# Patient Record
Sex: FEMALE | Race: BLACK OR AFRICAN AMERICAN | NOT HISPANIC OR LATINO | Employment: OTHER | URBAN - METROPOLITAN AREA
[De-identification: names, ages, dates, MRNs, and addresses within clinical notes are randomized per-mention and may not be internally consistent; named-entity substitution may affect disease eponyms.]

---

## 2017-11-29 ENCOUNTER — HISTORICAL (OUTPATIENT)
Dept: RADIOLOGY | Facility: HOSPITAL | Age: 57
End: 2017-11-29

## 2019-03-15 ENCOUNTER — HISTORICAL (OUTPATIENT)
Dept: RADIOLOGY | Facility: HOSPITAL | Age: 59
End: 2019-03-15

## 2019-04-26 LAB
HUMAN PAPILLOMAVIRUS (HPV): NORMAL
PAP RECOMMENDATION EXT: NORMAL
PAP SMEAR: NORMAL

## 2020-06-04 ENCOUNTER — HISTORICAL (OUTPATIENT)
Dept: RADIOLOGY | Facility: HOSPITAL | Age: 60
End: 2020-06-04

## 2020-06-09 ENCOUNTER — HISTORICAL (OUTPATIENT)
Dept: ENDOSCOPY | Facility: HOSPITAL | Age: 60
End: 2020-06-09

## 2020-06-29 ENCOUNTER — HISTORICAL (OUTPATIENT)
Dept: RADIOLOGY | Facility: HOSPITAL | Age: 60
End: 2020-06-29

## 2021-08-02 ENCOUNTER — HISTORICAL (OUTPATIENT)
Dept: RADIOLOGY | Facility: HOSPITAL | Age: 61
End: 2021-08-02

## 2022-04-10 ENCOUNTER — HISTORICAL (OUTPATIENT)
Dept: ADMINISTRATIVE | Facility: HOSPITAL | Age: 62
End: 2022-04-10
Payer: MEDICARE

## 2022-04-28 VITALS
DIASTOLIC BLOOD PRESSURE: 83 MMHG | SYSTOLIC BLOOD PRESSURE: 152 MMHG | OXYGEN SATURATION: 95 % | BODY MASS INDEX: 26.22 KG/M2 | WEIGHT: 148 LBS | HEIGHT: 63 IN

## 2022-05-31 ENCOUNTER — OFFICE VISIT (OUTPATIENT)
Dept: RHEUMATOLOGY | Facility: CLINIC | Age: 62
End: 2022-05-31
Payer: MEDICARE

## 2022-05-31 VITALS
HEART RATE: 60 BPM | WEIGHT: 150.63 LBS | OXYGEN SATURATION: 95 % | DIASTOLIC BLOOD PRESSURE: 77 MMHG | RESPIRATION RATE: 18 BRPM | SYSTOLIC BLOOD PRESSURE: 167 MMHG | HEIGHT: 63 IN | BODY MASS INDEX: 26.69 KG/M2 | TEMPERATURE: 98 F

## 2022-05-31 DIAGNOSIS — D56.1: ICD-10-CM

## 2022-05-31 DIAGNOSIS — E55.9 VITAMIN D DEFICIENCY: ICD-10-CM

## 2022-05-31 DIAGNOSIS — M35.9 UNDIFFERENTIATED CONNECTIVE TISSUE DISEASE: Primary | ICD-10-CM

## 2022-05-31 DIAGNOSIS — E53.8 FOLIC ACID DEFICIENCY: ICD-10-CM

## 2022-05-31 DIAGNOSIS — L65.9 HAIR LOSS: ICD-10-CM

## 2022-05-31 PROCEDURE — 99214 OFFICE O/P EST MOD 30 MIN: CPT | Mod: PBBFAC | Performed by: INTERNAL MEDICINE

## 2022-05-31 PROCEDURE — 99999 PR PBB SHADOW E&M-EST. PATIENT-LVL IV: ICD-10-PCS | Mod: PBBFAC,,, | Performed by: INTERNAL MEDICINE

## 2022-05-31 PROCEDURE — 99213 OFFICE O/P EST LOW 20 MIN: CPT | Mod: S$PBB,,, | Performed by: INTERNAL MEDICINE

## 2022-05-31 PROCEDURE — 99999 PR PBB SHADOW E&M-EST. PATIENT-LVL IV: CPT | Mod: PBBFAC,,, | Performed by: INTERNAL MEDICINE

## 2022-05-31 PROCEDURE — 99213 PR OFFICE/OUTPT VISIT, EST, LEVL III, 20-29 MIN: ICD-10-PCS | Mod: S$PBB,,, | Performed by: INTERNAL MEDICINE

## 2022-05-31 RX ORDER — HYDROXYCHLOROQUINE SULFATE 200 MG/1
200 TABLET, FILM COATED ORAL 2 TIMES DAILY
COMMUNITY
Start: 2022-04-07 | End: 2022-05-31 | Stop reason: SDUPTHER

## 2022-05-31 RX ORDER — CLOPIDOGREL BISULFATE 75 MG/1
75 TABLET ORAL DAILY
COMMUNITY
Start: 2022-05-09

## 2022-05-31 RX ORDER — MULTIVIT-MIN/IRON/FOLIC/HRB186 3.3 MG-25
1 TABLET ORAL DAILY
Qty: 30 TABLET | Refills: 5 | Status: SHIPPED | OUTPATIENT
Start: 2022-05-31 | End: 2023-06-14

## 2022-05-31 RX ORDER — HYDROXYCHLOROQUINE SULFATE 200 MG/1
200 TABLET, FILM COATED ORAL 2 TIMES DAILY
Qty: 60 TABLET | Refills: 5 | Status: SHIPPED | OUTPATIENT
Start: 2022-05-31 | End: 2022-09-12 | Stop reason: SDUPTHER

## 2022-05-31 RX ORDER — FOLIC ACID-PYRIDOXINE-CYANOCOBALAMIN TAB 2.5-25-2 MG 2.5-25-2 MG
1 TAB ORAL EVERY MORNING
Qty: 30 TABLET | Refills: 5 | Status: SHIPPED | OUTPATIENT
Start: 2022-05-31 | End: 2022-09-12 | Stop reason: SDUPTHER

## 2022-05-31 RX ORDER — ASPIRIN 81 MG/1
81 TABLET ORAL DAILY
COMMUNITY

## 2022-05-31 RX ORDER — CALCIUM CITRATE/VITAMIN D3 200MG-6.25
1 TABLET ORAL DAILY
COMMUNITY
Start: 2022-04-26

## 2022-05-31 RX ORDER — LORATADINE 10 MG/1
10 TABLET ORAL DAILY
COMMUNITY
Start: 2022-05-23

## 2022-05-31 RX ORDER — FERROUS SULFATE TAB 325 MG (65 MG ELEMENTAL FE) 325 (65 FE) MG
65 TAB ORAL DAILY
COMMUNITY
Start: 2022-03-15

## 2022-05-31 RX ORDER — PANTOPRAZOLE SODIUM 40 MG/1
40 TABLET, DELAYED RELEASE ORAL 2 TIMES DAILY
COMMUNITY
Start: 2022-03-15 | End: 2022-09-12 | Stop reason: SDUPTHER

## 2022-05-31 RX ORDER — ALBUTEROL SULFATE 90 UG/1
2 AEROSOL, METERED RESPIRATORY (INHALATION) 2 TIMES DAILY PRN
COMMUNITY

## 2022-05-31 RX ORDER — ENALAPRIL MALEATE 5 MG/1
5 TABLET ORAL DAILY
COMMUNITY
Start: 2022-05-23

## 2022-05-31 RX ORDER — FOLIC ACID-PYRIDOXINE-CYANOCOBALAMIN TAB 2.5-25-2 MG 2.5-25-2 MG
1 TAB ORAL EVERY MORNING
COMMUNITY
Start: 2022-05-09 | End: 2022-05-31 | Stop reason: SDUPTHER

## 2022-05-31 RX ORDER — ATORVASTATIN CALCIUM 10 MG/1
10 TABLET, FILM COATED ORAL DAILY
COMMUNITY
End: 2023-06-14

## 2022-05-31 RX ORDER — ATENOLOL 50 MG/1
50 TABLET ORAL DAILY
COMMUNITY
Start: 2022-05-23

## 2022-05-31 NOTE — PROGRESS NOTES
"Subjective:       Patient ID: Robyn Hong is a 61 y.o. female.    Chief Complaint: Follow-up    The patient is complaining of joint pain involving the MCP PIP wrist elbow shoulders hips knees and ankles bilaterally.  The pain is 2/10 in intensity dull in quality and continuous.  That is associated with a morning stiffness lasting for less than 60 minutes.  Is also having difficulty maintaining a good night of sleep.  This has been associated with myalgias.  Muscle aches are 2/10 in intensity dull in quality and continuous.  They are associated with fatigue.  No fever no chills no others.    Follow-up  Pertinent negatives include no abdominal pain, chest pain, chills, congestion, fever, rash, vomiting or weakness.     Review of Systems   Constitutional: Negative for appetite change, chills and fever.   HENT: Negative for congestion, ear pain, mouth sores, nosebleeds and trouble swallowing.    Eyes: Negative for photophobia and discharge.   Respiratory: Negative for chest tightness and shortness of breath.    Cardiovascular: Negative for chest pain.   Gastrointestinal: Negative for abdominal pain and vomiting.   Endocrine: Negative.    Genitourinary: Negative for hematuria.   Musculoskeletal:        As per HPI   Skin: Negative for rash.        Hair loss    Neurological: Negative for weakness.         Objective:   BP (!) 167/77   Pulse 60   Temp 98.2 °F (36.8 °C) (Oral)   Resp 18   Ht 5' 3" (1.6 m)   Wt 68.3 kg (150 lb 9.6 oz)   SpO2 95%   BMI 26.68 kg/m²      Physical Exam   Musculoskeletal:      Right shoulder: Normal.      Left shoulder: Normal.      Right elbow: Normal.      Left elbow: Normal.      Right wrist: Normal.      Left wrist: Normal.      Right hip: Normal.      Left hip: Normal.      Right knee: Normal.      Left knee: Normal.   Skin:   Hair loss        Right Side Rheumatological Exam     Examination finds the shoulder, elbow, wrist, knee, 1st PIP, 1st MCP, 2nd PIP, 2nd MCP, 3rd PIP, 3rd MCP, " 4th PIP, 4th MCP, 5th PIP, 5th MCP, temporomandibular, hip, ankle, 1st MTP, 2nd MTP, 3rd MTP, 4th MTP and 5th MTP normal.    Left Side Rheumatological Exam     Examination finds the shoulder, elbow, wrist, knee, 1st PIP, 1st MCP, 2nd PIP, 2nd MCP, 3rd PIP, 3rd MCP, 4th PIP, 4th MCP, 5th PIP, 5th MCP, temporomandibular, hip, ankle, 1st MTP, 2nd MTP, 3rd MTP, 4th MTP and 5th MTP normal.         Completed Fibromyalgia exam 2/18 tender points.  No data to display     Assessment:       1. Undifferentiated connective tissue disease    2. Homozygous beta thalassemia    3. Vitamin D deficiency    4. Folic acid deficiency    5. Hair loss            Plan:       Problem List Items Addressed This Visit        Immunology/Multi System    Undifferentiated connective tissue disease - Primary    Relevant Medications    FOLBIC 2.5-25-2 mg Tab    hydrOXYchloroQUINE (PLAQUENIL) 200 mg tablet    multivit min-iron-FA-herb 186 (HAIR, SKIN AND NAILS ADVANCED) 3.3 mg iron-25 mcg Tab       Oncology    Homozygous beta thalassemia    Relevant Medications    FOLBIC 2.5-25-2 mg Tab    hydrOXYchloroQUINE (PLAQUENIL) 200 mg tablet    multivit min-iron-FA-herb 186 (HAIR, SKIN AND NAILS ADVANCED) 3.3 mg iron-25 mcg Tab       Endocrine    Vitamin D deficiency    Relevant Medications    FOLBIC 2.5-25-2 mg Tab    hydrOXYchloroQUINE (PLAQUENIL) 200 mg tablet    multivit min-iron-FA-herb 186 (HAIR, SKIN AND NAILS ADVANCED) 3.3 mg iron-25 mcg Tab    Folic acid deficiency    Relevant Medications    FOLBIC 2.5-25-2 mg Tab    hydrOXYchloroQUINE (PLAQUENIL) 200 mg tablet    multivit min-iron-FA-herb 186 (HAIR, SKIN AND NAILS ADVANCED) 3.3 mg iron-25 mcg Tab      Other Visit Diagnoses     Hair loss        Relevant Medications    FOLBIC 2.5-25-2 mg Tab    hydrOXYchloroQUINE (PLAQUENIL) 200 mg tablet    multivit min-iron-FA-herb 186 (HAIR, SKIN AND NAILS ADVANCED) 3.3 mg iron-25 mcg Tab

## 2022-07-25 ENCOUNTER — OFFICE VISIT (OUTPATIENT)
Dept: HEMATOLOGY/ONCOLOGY | Facility: CLINIC | Age: 62
End: 2022-07-25
Payer: MEDICARE

## 2022-07-25 VITALS
RESPIRATION RATE: 18 BRPM | HEIGHT: 63 IN | TEMPERATURE: 99 F | WEIGHT: 152.69 LBS | OXYGEN SATURATION: 98 % | DIASTOLIC BLOOD PRESSURE: 65 MMHG | HEART RATE: 76 BPM | BODY MASS INDEX: 27.05 KG/M2 | SYSTOLIC BLOOD PRESSURE: 126 MMHG

## 2022-07-25 DIAGNOSIS — D56.1 BETA THALASSEMIA: ICD-10-CM

## 2022-07-25 DIAGNOSIS — D72.819 LEUKOPENIA, UNSPECIFIED TYPE: Primary | ICD-10-CM

## 2022-07-25 PROCEDURE — 99214 OFFICE O/P EST MOD 30 MIN: CPT | Mod: ,,, | Performed by: NURSE PRACTITIONER

## 2022-07-25 PROCEDURE — 99214 PR OFFICE/OUTPT VISIT, EST, LEVL IV, 30-39 MIN: ICD-10-PCS | Mod: ,,, | Performed by: NURSE PRACTITIONER

## 2022-07-25 NOTE — PROGRESS NOTES
"[facility]    PATIENT: Robyn Hong  MRN: [id number]  DATE: 7/25/2022    Diagnosis: Anemia and leukocytopenia.     Chief Complaint: Results.     Oncologic History:   Robyn is referred for evaluation of anemia and leukocytopenia. She states that she was anemic about a year ago after she had a bleeding ulcer. She is not aware of any problems with her white blood cell count. She has not had an increase in infections. She denies any symptoms of lupus such as rash or joint pain    Subjective:     Interval History:  07/25/2022:  Ms. Hong presents to clinic today for a 3 month follow up visit for anemia and leukocytopenia.   States has been doing good. No fever or infections. No hospital or ER visits last 3 months.   Good energy level, no SOB.   Taking oral iron, one tablet TID. No nausea or constipation.   Folic acid one tablet daily. Per Dr. Zacarias.   States GERD reported last visit has resolved.     1/26/22: WBC 3.65, ANC 2.5, HGB 10.1, MCV 88, PLT 205KALC 0.8, CMPWNL. No infections. When asked what problems she is having she says "none". Is UTD on MMG; last colonoscopy 2019 or 2020 7/26/21 Returns to go over additional testing. Has beta thalassemia minor with elevated HBG A2 and F. She has not heard anything about the rheumatology referral and I will place again. She went to the ED in early July with elevated CBG and was found to have a UTI. Patient given a hand out regarding thalassemia.     Past Medical History:   Undifferentiated connective tissue disease, Leukopenia  Vitamin D deficiency  Folate deficiency  Beta thalassemia major    Past Surgical History:   Procedure Laterality Date    BREAST SURGERY      CYST REMOVAL      on Left ear     polyp removal      stents      Bilateral legs    TUBAL LIGATION       Family History: No family history on file.    Social History:  reports that she has been smoking cigarettes. She has never used smokeless tobacco. She reports " current alcohol use. She reports that she does not use drugs.    Allergies:  Review of patient's allergies indicates:   Allergen Reactions    Codeine      Other reaction(s): Indomethacin  The patient reports it makes her feel bad and pass out      Indomethacin      The pt reports it makes her feel bad and pass out         Medications:  Current Outpatient Medications   Medication Sig Dispense Refill    albuterol (PROVENTIL/VENTOLIN HFA) 90 mcg/actuation inhaler Inhale 2 puffs into the lungs 2 (two) times daily as needed.      aspirin (ECOTRIN) 81 MG EC tablet Take 81 mg by mouth once daily.      atenoloL (TENORMIN) 50 MG tablet Take 50 mg by mouth once daily.      atorvastatin (LIPITOR) 10 MG tablet Take 10 mg by mouth once daily.      clopidogreL (PLAVIX) 75 mg tablet Take 75 mg by mouth once daily.      enalapril (VASOTEC) 5 MG tablet Take 5 mg by mouth once daily.      FEROSUL 325 mg (65 mg iron) Tab tablet Take 65 mg by mouth 3 (three) times daily.      FOLBIC 2.5-25-2 mg Tab Take 1 tablet by mouth every morning. 30 tablet 5    hydrOXYchloroQUINE (PLAQUENIL) 200 mg tablet Take 1 tablet (200 mg total) by mouth 2 (two) times daily. 60 tablet 5    loratadine (CLARITIN) 10 mg tablet Take 10 mg by mouth once daily.      multivit min-iron-FA-herb 186 (HAIR, SKIN AND NAILS ADVANCED) 3.3 mg iron-25 mcg Tab Take 1 capsule by mouth once daily. 30 tablet 5    pantoprazole (PROTONIX) 40 MG tablet Take 40 mg by mouth 2 (two) times daily.      TRUE METRIX GLUCOSE TEST STRIP Strp 1 strip once daily.       No current facility-administered medications for this visit.       Review of Systems  Constitutional: no weight loss, no fatigue, no fever, no chills, no weakness, no trouble sleeping.  Eyes: no vision loss/changes.   Head: no headache, no head injury, no neck pain.   Neck: no lumps, no swollen glands.   Ears: no decreased hearing, no ringing, no earache, no drainage.   Nose: no stuffiness, no discharge, no  itching, no hay fever, no nosebleeds,   Throat: no sore tongue, no dry mouth, no sore throat, no hoarseness, no thrush, no non-healing sores.  Cardiovascular: no chest pain or discomfort, no tightness, no palpitations, no SOB with activity, no difficulty breathing while supine, no swelling, no sudden awakening from sleep with SOB.  Vascular: no calf pain with walking, no leg cramping.  Respiratory: no cough, no sputum, no coughing up blood, no SOB, no wheezing, no painful breathing.  Gastrointestinal: no swallowing difficulty, no heartburn, no change in appetite, no nausea, no change in bowel habits, no rectal bleeding, no constipation, no diarrhea, no yellow eyes or skin.  Urinary: no frequency, no urgency, no burning or pain, no blood in urine, no change in urinary strength.  Musculoskeletal: no muscle or joint pain, no stiffness, no back pain, no redness of joints, no swelling of joints, no trauma.  Skin: no rashes, no lumps, no itching, no dryness, color normal for ethnicity, no hair or nail changes.  Neurologic: no dizziness, no fainting, no seizures, no weakness, no numbness, no tingling, no tremors.  Psychiatric: no nervousness, no stress, no depression, no memory loss.  Endocrine: no heat or cold intolerance, no sweating, no frequent urination, no thirst, no change in appetite.  Hematologic: no ease of bruising, no ease of bleeding.    ECOG Performance Status: 0   Objective:      Vitals: There were no vitals filed for this visit.  BMI: There is no height or weight on file to calculate BMI.    Physical Exam  General: well-developed well-nourished in no acute distress  Eye: PERRLA, EOMI, clear conjunctiva, eyelids normal  HENT: TMs/ear canals clear, oropharynx without erythema/exudate, oropharynx and nasal mucosal surfaces moist, no maxillary/frontal sinus tenderness to palpation  Neck: full range of motion, no thyromegaly or lymphadenopathy  Breast/Chest:   Respiratory: clear to auscultation  bilaterally  Cardiovascular: regular rate and rhythm without murmurs, gallops or rubs  Gastrointestinal: soft, non-tender, non-distended with normal bowel sounds, without masses to palpation  Genitourinary: no CVA tenderness to palpation  Musculoskeletal: full range of motion of all extremities/spine without limitation or discomfort  Integumentary: no rashes or skin lesions present  Neurologic: cranial nerves intact, no signs of peripheral neurological deficit, motor/sensory function intact  Lymphatics: No cervical, axillary, or inguinal nodes.    Laboratory results:   7/20/2022: WBC 3.88, Hgb 11.9, plt 204, ANC 2.1, potassium 3.4, creatinine 0.80, calcium 8.7, iron 151, ferritin 96, folate >20.0, B-12 >2000    Imaging:     Assessment:   1. Leukopenia D72.819  1. It is certainly possible that the patient has leukopenia related to autoimmune disease, plaquenil) however this could also be a benign ethnic neutropenia.   She continues to feel well. WBC is stable at 3.88, ANC 2.1. She has no history of infection or fever. bone marrow biopsy previously offered, but she declines.   2. Beta thalassemia Beta w/elevated HGB A2/F- Resolved anemia. Normal indices.  She is asymptomatic. Will continue to monitor.     3. Undifferentiated connective tissue disease M35.9   on Plaquenil. c/w follow-up visits with Dr Zacarias. He has her on oral iron, folate and Vit D.    4. Hypokalemia. Mild She does not take oral potassium. Is not on a diuretic. Increase potassium rich foods. List of foods provided.     Plan:     # RTC 6 months with MD,  CBC CMP folate, b-12, iron and ferritin done prior.       Perlita Devine, NP-C

## 2022-08-12 DIAGNOSIS — Z12.31 BREAST CANCER SCREENING BY MAMMOGRAM: Primary | ICD-10-CM

## 2022-09-07 ENCOUNTER — HOSPITAL ENCOUNTER (OUTPATIENT)
Dept: RADIOLOGY | Facility: HOSPITAL | Age: 62
Discharge: HOME | End: 2022-09-07
Attending: NURSE PRACTITIONER
Payer: MEDICARE

## 2022-09-07 DIAGNOSIS — Z12.31 BREAST CANCER SCREENING BY MAMMOGRAM: ICD-10-CM

## 2022-09-07 PROCEDURE — 77063 MAMMO DIGITAL SCREENING BILAT WITH TOMO: ICD-10-PCS | Mod: 26,,, | Performed by: RADIOLOGY

## 2022-09-07 PROCEDURE — 77067 SCR MAMMO BI INCL CAD: CPT | Mod: TC

## 2022-09-07 PROCEDURE — 77063 BREAST TOMOSYNTHESIS BI: CPT | Mod: 26,,, | Performed by: RADIOLOGY

## 2022-09-07 PROCEDURE — 77067 SCR MAMMO BI INCL CAD: CPT | Mod: 26,,, | Performed by: RADIOLOGY

## 2022-09-07 PROCEDURE — 77067 MAMMO DIGITAL SCREENING BILAT WITH TOMO: ICD-10-PCS | Mod: 26,,, | Performed by: RADIOLOGY

## 2022-09-12 ENCOUNTER — OFFICE VISIT (OUTPATIENT)
Dept: RHEUMATOLOGY | Facility: CLINIC | Age: 62
End: 2022-09-12
Payer: MEDICARE

## 2022-09-12 VITALS
HEART RATE: 74 BPM | TEMPERATURE: 99 F | BODY MASS INDEX: 26.69 KG/M2 | HEIGHT: 63 IN | DIASTOLIC BLOOD PRESSURE: 74 MMHG | RESPIRATION RATE: 20 BRPM | WEIGHT: 150.63 LBS | SYSTOLIC BLOOD PRESSURE: 149 MMHG | OXYGEN SATURATION: 98 %

## 2022-09-12 DIAGNOSIS — E55.9 VITAMIN D DEFICIENCY: ICD-10-CM

## 2022-09-12 DIAGNOSIS — M35.9 UNDIFFERENTIATED CONNECTIVE TISSUE DISEASE: Primary | ICD-10-CM

## 2022-09-12 DIAGNOSIS — E53.8 FOLIC ACID DEFICIENCY: ICD-10-CM

## 2022-09-12 DIAGNOSIS — D56.1: ICD-10-CM

## 2022-09-12 DIAGNOSIS — L65.9 HAIR LOSS: ICD-10-CM

## 2022-09-12 PROBLEM — I73.9 PAD (PERIPHERAL ARTERY DISEASE): Status: ACTIVE | Noted: 2019-05-13

## 2022-09-12 PROBLEM — I10 HYPERTENSION: Status: ACTIVE | Noted: 2022-09-12

## 2022-09-12 PROBLEM — D72.819 LEUKOPENIA: Status: ACTIVE | Noted: 2022-09-12

## 2022-09-12 PROBLEM — J44.9 CHRONIC OBSTRUCTIVE PULMONARY DISEASE: Status: ACTIVE | Noted: 2022-09-12

## 2022-09-12 PROCEDURE — 99213 PR OFFICE/OUTPT VISIT, EST, LEVL III, 20-29 MIN: ICD-10-PCS | Mod: S$PBB,,, | Performed by: INTERNAL MEDICINE

## 2022-09-12 PROCEDURE — 99999 PR PBB SHADOW E&M-EST. PATIENT-LVL V: CPT | Mod: PBBFAC,,, | Performed by: INTERNAL MEDICINE

## 2022-09-12 PROCEDURE — 99215 OFFICE O/P EST HI 40 MIN: CPT | Mod: PBBFAC | Performed by: INTERNAL MEDICINE

## 2022-09-12 PROCEDURE — 99213 OFFICE O/P EST LOW 20 MIN: CPT | Mod: S$PBB,,, | Performed by: INTERNAL MEDICINE

## 2022-09-12 PROCEDURE — 99999 PR PBB SHADOW E&M-EST. PATIENT-LVL V: ICD-10-PCS | Mod: PBBFAC,,, | Performed by: INTERNAL MEDICINE

## 2022-09-12 RX ORDER — PANTOPRAZOLE SODIUM 40 MG/1
40 TABLET, DELAYED RELEASE ORAL 2 TIMES DAILY
Qty: 90 TABLET | Refills: 3 | Status: SHIPPED | OUTPATIENT
Start: 2022-09-12 | End: 2023-02-13 | Stop reason: SDUPTHER

## 2022-09-12 RX ORDER — FOLIC ACID-PYRIDOXINE-CYANOCOBALAMIN TAB 2.5-25-2 MG 2.5-25-2 MG
1 TAB ORAL EVERY MORNING
Qty: 90 TABLET | Refills: 3 | Status: SHIPPED | OUTPATIENT
Start: 2022-09-12 | End: 2023-02-13 | Stop reason: SDUPTHER

## 2022-09-12 RX ORDER — HYDROXYCHLOROQUINE SULFATE 200 MG/1
200 TABLET, FILM COATED ORAL 2 TIMES DAILY
Qty: 180 TABLET | Refills: 3 | Status: SHIPPED | OUTPATIENT
Start: 2022-09-12 | End: 2023-02-13 | Stop reason: SDUPTHER

## 2022-09-12 NOTE — PROGRESS NOTES
"Subjective:       Patient ID: Robyn Hong is a 61 y.o. female.    Chief Complaint: FOLLOW UP (FOLLOW UP)    The patient is complaining of joint pain involving the MCP PIP wrist elbow shoulders hips knees and ankles bilaterally.  The pain is 2/10 in intensity dull in quality and continuous.  That is associated with a morning stiffness lasting for more than 60 minutes.  Is also having difficulty maintaining a good night of sleep.  This has been associated with myalgias.  Muscle aches are 2/10 in intensity dull in quality and continuous.  They are associated with fatigue.  No fever no chills no others.      Review of Systems   Constitutional:  Negative for appetite change, chills and fever.   HENT:  Negative for congestion, ear pain, mouth sores, nosebleeds and trouble swallowing.    Eyes:  Negative for photophobia and discharge.   Respiratory:  Negative for chest tightness and shortness of breath.    Cardiovascular:  Negative for chest pain.   Gastrointestinal:  Negative for abdominal pain and vomiting.   Endocrine: Negative.    Genitourinary:  Negative for hematuria.   Musculoskeletal:         As per HPI   Skin:  Negative for rash.   Neurological:  Negative for weakness.       Objective:   BP (!) 149/74 (BP Location: Left arm, Patient Position: Sitting, BP Method: Medium (Automatic))   Pulse 74   Temp 98.6 °F (37 °C) (Oral)   Resp 20   Ht 5' 3" (1.6 m)   Wt 68.3 kg (150 lb 9.6 oz)   SpO2 98%   BMI 26.68 kg/m²      Physical Exam   Constitutional: She is oriented to person, place, and time. She appears well-developed and well-nourished. No distress.   HENT:   Head: Normocephalic and atraumatic.   Right Ear: External ear normal.   Left Ear: External ear normal.   Eyes: Pupils are equal, round, and reactive to light.   Cardiovascular: Normal rate, regular rhythm and normal heart sounds.   Pulmonary/Chest: Breath sounds normal.   Abdominal: Soft. There is no abdominal tenderness.   Musculoskeletal:      Right " shoulder: Normal.      Left shoulder: Normal.      Right elbow: Normal.      Left elbow: Normal.      Right wrist: Normal.      Left wrist: Normal.      Cervical back: Neck supple.      Right hip: Normal.      Left hip: Normal.      Right knee: Normal.      Left knee: Normal.   Lymphadenopathy:     She has no cervical adenopathy.   Neurological: She is alert and oriented to person, place, and time. She displays normal reflexes. No cranial nerve deficit or sensory deficit. She exhibits normal muscle tone. Coordination normal.   Skin: No rash noted. No erythema.   Vitals reviewed.      Right Side Rheumatological Exam     Examination finds the shoulder, elbow, wrist, knee, 1st PIP, 1st MCP, 2nd PIP, 2nd MCP, 3rd PIP, 3rd MCP, 4th PIP, 4th MCP, 5th PIP, 5th MCP, temporomandibular, hip, ankle, 1st MTP, 2nd MTP, 3rd MTP, 4th MTP and 5th MTP normal.    Left Side Rheumatological Exam     Examination finds the shoulder, elbow, wrist, knee, 1st PIP, 1st MCP, 2nd PIP, 2nd MCP, 3rd PIP, 3rd MCP, 4th PIP, 4th MCP, 5th PIP, 5th MCP, temporomandibular, hip, ankle, 1st MTP, 2nd MTP, 3rd MTP, 4th MTP and 5th MTP normal.       Completed Fibromyalgia exam 11/18 tender points.  No data to display     Assessment:       1. Undifferentiated connective tissue disease    2. Homozygous beta thalassemia    3. Vitamin D deficiency    4. Folic acid deficiency    5. Hair loss              Plan:       Problem List Items Addressed This Visit          Immunology/Multi System    Undifferentiated connective tissue disease - Primary    Relevant Medications    FOLBIC 2.5-25-2 mg Tab    hydrOXYchloroQUINE (PLAQUENIL) 200 mg tablet    pantoprazole (PROTONIX) 40 MG tablet       Oncology    Homozygous beta thalassemia    Relevant Medications    FOLBIC 2.5-25-2 mg Tab    hydrOXYchloroQUINE (PLAQUENIL) 200 mg tablet    pantoprazole (PROTONIX) 40 MG tablet       Endocrine    Vitamin D deficiency    Relevant Medications    FOLBIC 2.5-25-2 mg Tab     hydrOXYchloroQUINE (PLAQUENIL) 200 mg tablet    pantoprazole (PROTONIX) 40 MG tablet    Folic acid deficiency    Relevant Medications    FOLBIC 2.5-25-2 mg Tab    hydrOXYchloroQUINE (PLAQUENIL) 200 mg tablet    pantoprazole (PROTONIX) 40 MG tablet     Other Visit Diagnoses       Hair loss        Relevant Medications    FOLBIC 2.5-25-2 mg Tab    hydrOXYchloroQUINE (PLAQUENIL) 200 mg tablet    pantoprazole (PROTONIX) 40 MG tablet

## 2022-09-26 ENCOUNTER — DOCUMENTATION ONLY (OUTPATIENT)
Dept: ADMINISTRATIVE | Facility: HOSPITAL | Age: 62
End: 2022-09-26
Payer: MEDICARE

## 2022-10-05 ENCOUNTER — OFFICE VISIT (OUTPATIENT)
Dept: GYNECOLOGY | Facility: CLINIC | Age: 62
End: 2022-10-05
Payer: MEDICARE

## 2022-10-05 VITALS
HEART RATE: 85 BPM | RESPIRATION RATE: 18 BRPM | DIASTOLIC BLOOD PRESSURE: 63 MMHG | WEIGHT: 154 LBS | BODY MASS INDEX: 27.29 KG/M2 | TEMPERATURE: 98 F | HEIGHT: 63 IN | SYSTOLIC BLOOD PRESSURE: 182 MMHG | OXYGEN SATURATION: 99 %

## 2022-10-05 DIAGNOSIS — Z12.4 PAP SMEAR FOR CERVICAL CANCER SCREENING: Primary | ICD-10-CM

## 2022-10-05 DIAGNOSIS — Z72.0 TOBACCO USE: ICD-10-CM

## 2022-10-05 PROCEDURE — 88142 CYTOPATH C/V THIN LAYER: CPT | Performed by: NURSE PRACTITIONER

## 2022-10-05 PROCEDURE — G0101 CA SCREEN;PELVIC/BREAST EXAM: HCPCS | Mod: GZ,S$PBB,, | Performed by: NURSE PRACTITIONER

## 2022-10-05 PROCEDURE — 99214 OFFICE O/P EST MOD 30 MIN: CPT | Mod: PBBFAC | Performed by: NURSE PRACTITIONER

## 2022-10-05 PROCEDURE — G0101 PR CA SCREEN;PELVIC/BREAST EXAM: ICD-10-PCS | Mod: GZ,S$PBB,, | Performed by: NURSE PRACTITIONER

## 2022-10-05 PROCEDURE — 87624 HPV HI-RISK TYP POOLED RSLT: CPT | Performed by: NURSE PRACTITIONER

## 2022-10-05 RX ORDER — GLUCOSAM/CHON-MSM1/C/MANG/BOSW 500-416.6
TABLET ORAL DAILY
COMMUNITY
Start: 2022-06-27

## 2022-10-05 RX ORDER — FLUTICASONE PROPIONATE 50 MCG
1 SPRAY, SUSPENSION (ML) NASAL DAILY
COMMUNITY
Start: 2022-09-21

## 2022-10-05 RX ORDER — AMLODIPINE BESYLATE 10 MG/1
10 TABLET ORAL DAILY
COMMUNITY
Start: 2022-09-05

## 2022-10-05 NOTE — PROGRESS NOTES
"  Subjective:       Patient ID: Robyn Hong is a 61 y.o. female.    Chief Complaint:  Well Woman    History of Present Illness  Pt is a  here for annual GYN exam. Denies hx of cervical dysplasia. Last pap -NIL, HPV negative. She is postmenopausal since . Denies hot flashes. Denies vaginal bleeding, itching, or discharge. Denies any abdominal or pelvic pain. Hx of left breast lumpectomy x 3. She reports that all were benign. MG-22-BIRADS 1. Denies any breast or urinary complaints. Denies any family hx of breast, uterine, or ovarian cancer. Admits tobacco use. PCP-Latisha Jenkins at [facility]. Colonoscopy with polypectomy x2 in 10/2019, repeat in  per pt. No complaints today.    GYN & OB History  No LMP recorded. Patient is postmenopausal.     Review of patient's allergies indicates:   Allergen Reactions    Codeine      Other reaction(s): Indomethacin  The patient reports it makes her feel bad and pass out      Indomethacin      The pt reports it makes her feel bad and pass out       Past Medical History:   Diagnosis Date    Abnormal Pap smear of cervix     Diabetes mellitus     Hypertension      OB History    Para Term  AB Living   1 1           SAB IAB Ectopic Multiple Live Births                  # Outcome Date GA Lbr Kevin/2nd Weight Sex Delivery Anes PTL Lv   1 Para                 Review of Systems  Review of Systems    Negative except for pertinent findings for positives per HPI     Objective:    Physical Exam    BP (!) 182/63 (BP Location: Left arm, Patient Position: Sitting, BP Method: Medium (Automatic))   Pulse 85   Temp 98.4 °F (36.9 °C)   Resp 18   Ht 5' 3" (1.6 m)   Wt 69.9 kg (154 lb)   SpO2 99%   BMI 27.28 kg/m²   GENERAL: Well-developed female in no acute distress.  SKIN: Normal to inspection, warm and intact.  BREASTS: No masses, lumps, discharge, tenderness.  VULVA: General appearance normal; external genitalia with no lesions or " erythema.  VAGINA: Mucosa pale, no discharge or lesions.  CERVIX: Pale stenotic, no erythema or discharge.  BIMANUAL EXAM: reveals a 8 week-sized uterus. The uterus is non tender. Brice adnexa reveal no evidence of masses, tenderness.  PSYCHIATRIC: Patient is oriented to person, place, and time. Mood and affect are normal.    Assessment:         1. Pap smear for cervical cancer screening    2. Tobacco use         Plan:   Robyn was seen today for well woman.    Diagnoses and all orders for this visit:    Pap smear for cervical cancer screening  -     Liquid-Based Pap Smear, Screening Screening    Tobacco use    Pap today  Smoking cessation counseling provided. Instructed on smoking cessation program through [facility] and pharmacological interventions to aid in cessation.  Follow up in about 1 year (around 10/5/2023) for annual exam.

## 2022-10-11 LAB
HPV16+18+H RISK 12 DNA CVX-IMP: NEGATIVE
INSULIN SERPL-ACNC: NORMAL U[IU]/ML
LAB AP BETHESDA CATEGORY: NORMAL
LAB AP CLINICAL FINDINGS: NORMAL
LAB AP CONTRACEPTIVES: NORMAL
LAB AP GYN MOLECULAR TESTING: NORMAL
LAB AP LMP DATE: NORMAL
LAB AP OCHS PAP SPECIMEN ADEQUACY: NORMAL
LAB AP OHS PAP INTERPRETATION: NORMAL
LAB AP PAP DISCLAIMER COMMENTS: NORMAL
LAB AP PAP ESTROGEN REPLACEMENT THERAPY: NORMAL
LAB AP PAP PMP: NORMAL
LAB AP PAP PREVIOUS BX: NORMAL
LAB AP PAP PRIOR TREATMENT: NORMAL

## 2022-12-05 ENCOUNTER — TELEPHONE (OUTPATIENT)
Dept: RHEUMATOLOGY | Facility: CLINIC | Age: 62
End: 2022-12-05
Payer: MEDICARE

## 2022-12-05 NOTE — TELEPHONE ENCOUNTER
----- Message from Consuelo Pereira sent at 12/5/2022  2:50 PM CST -----  Regarding: Return Call(medical Advice)  Type:  Needs Medical Advice    Who Called: Robyn    Symptoms (please be specific): Went to ER arthritis in upper left hip was inflamed     How long has patient had these symptoms:      Would the patient rather a call back or a response via [facility]? Call back    Best Call Back Number: [phone]    Additional Information: Robyn stated someone called her her and she was returning the call.  She stated she went to the ER and was prescribed some pain meds for arthritis and wanted to know if Dr Zacarias still wants her to continue the meds.  Requesting someone call her to discuss

## 2023-01-23 ENCOUNTER — OFFICE VISIT (OUTPATIENT)
Dept: HEMATOLOGY/ONCOLOGY | Facility: CLINIC | Age: 63
End: 2023-01-23
Payer: MEDICARE

## 2023-01-23 VITALS
HEIGHT: 63 IN | RESPIRATION RATE: 20 BRPM | TEMPERATURE: 98 F | OXYGEN SATURATION: 100 % | BODY MASS INDEX: 26.38 KG/M2 | HEART RATE: 75 BPM | DIASTOLIC BLOOD PRESSURE: 73 MMHG | SYSTOLIC BLOOD PRESSURE: 140 MMHG | WEIGHT: 148.88 LBS

## 2023-01-23 DIAGNOSIS — D72.819 LEUKOPENIA, UNSPECIFIED TYPE: ICD-10-CM

## 2023-01-23 DIAGNOSIS — D56.1 BETA THALASSEMIA: Primary | ICD-10-CM

## 2023-01-23 PROCEDURE — 99214 PR OFFICE/OUTPT VISIT, EST, LEVL IV, 30-39 MIN: ICD-10-PCS | Mod: ,,, | Performed by: NURSE PRACTITIONER

## 2023-01-23 PROCEDURE — 99214 OFFICE O/P EST MOD 30 MIN: CPT | Mod: ,,, | Performed by: NURSE PRACTITIONER

## 2023-01-23 NOTE — PROGRESS NOTES
"[facility]    PATIENT: Robyn Hong  MRN: [id number]  DATE: 1/23/2023    Diagnosis: Anemia and leukocytopenia.     Chief Complaint: Results.     Oncologic History:   Robyn is referred for evaluation of anemia and leukocytopenia. She states that she was anemic about a year ago after she had a bleeding ulcer. She is not aware of any problems with her white blood cell count. She has not had an increase in infections. She denies any symptoms of lupus such as rash or joint pain    Subjective:     Interval History:  07/25/2022:  Ms. Hong presents to clinic today for a 3 month follow up visit for anemia and leukocytopenia.   She is taking oral iron, one tablet BID. Tolerating well without constipation or nausea.   Feels well, and has no complaints. Denies any fatigue, SOB with exertion, headaches, dizziness, CP or heart palpitations.   She went to the ER end of November 2022 for a flare in her RA.  Continues to take Folic acid one tablet daily. Per Dr. Zacarias.   She is working on weight loss by exercising. She has lost 6 pounds.   States she is up to date on her mammogram.     1/26/22: WBC 3.65, ANC 2.5, HGB 10.1, MCV 88, PLT 205KALC 0.8, CMPWNL. No infections. When asked what problems she is having she says "none". Is UTD on MMG; last colonoscopy 2019 or 2020 7/26/21 Returns to go over additional testing. Has beta thalassemia minor with elevated HBG A2 and F. She has not heard anything about the rheumatology referral and I will place again. She went to the ED in early July with elevated CBG and was found to have a UTI. Patient given a hand out regarding thalassemia.     Past Medical History:   Undifferentiated connective tissue disease, Leukopenia  Vitamin D deficiency  Folate deficiency  Beta thalassemia major    Past Surgical History:   Procedure Laterality Date    BREAST SURGERY      CYST REMOVAL      on Left ear     polyp removal      stents      Bilateral legs    TUBAL " LIGATION       Family History:   Family History   Problem Relation Age of Onset    Lung cancer Mother     Prostate cancer Brother     Lung cancer Sister      Social History:  reports that she has been smoking cigarettes. She has been smoking an average of .5 packs per day. She has never used smokeless tobacco. She reports current alcohol use. She reports that she does not use drugs.    Allergies:  Review of patient's allergies indicates:   Allergen Reactions    Codeine      Other reaction(s): Indomethacin  The patient reports it makes her feel bad and pass out      Indomethacin      The pt reports it makes her feel bad and pass out       Current Outpatient Medications   Medication Sig Dispense Refill    albuterol (PROVENTIL/VENTOLIN HFA) 90 mcg/actuation inhaler Inhale 2 puffs into the lungs 2 (two) times daily as needed.      amLODIPine (NORVASC) 10 MG tablet       aspirin (ECOTRIN) 81 MG EC tablet Take 81 mg by mouth once daily.      atenoloL (TENORMIN) 50 MG tablet Take 50 mg by mouth once daily.      atorvastatin (LIPITOR) 10 MG tablet Take 10 mg by mouth once daily.      clopidogreL (PLAVIX) 75 mg tablet Take 75 mg by mouth once daily.      enalapril (VASOTEC) 5 MG tablet Take 5 mg by mouth once daily.      FEROSUL 325 mg (65 mg iron) Tab tablet Take 65 mg by mouth 3 (three) times daily.      fluticasone propionate (FLONASE) 50 mcg/actuation nasal spray 1 spray by Each Nostril route once daily.      FOLBIC 2.5-25-2 mg Tab Take 1 tablet by mouth every morning. 90 tablet 3    hydrOXYchloroQUINE (PLAQUENIL) 200 mg tablet Take 1 tablet (200 mg total) by mouth 2 (two) times daily. 180 tablet 3    loratadine (CLARITIN) 10 mg tablet Take 10 mg by mouth once daily.      multivit min-iron-FA-herb 186 (HAIR, SKIN AND NAILS ADVANCED) 3.3 mg iron-25 mcg Tab Take 1 capsule by mouth once daily. (Patient not taking: Reported on 10/5/2022) 30 tablet 5    pantoprazole (PROTONIX) 40 MG tablet Take 1 tablet (40 mg total) by mouth  "2 (two) times daily. 90 tablet 3    TRUE METRIX GLUCOSE TEST STRIP Strp 1 strip once daily.      TRUEPLUS LANCETS 28 gauge Misc Apply topically once daily.       No current facility-administered medications for this visit.       Review of Systems   Constitutional:  Negative for appetite change and unexpected weight change.   HENT:  Negative for mouth sores.    Eyes:  Negative for visual disturbance.   Respiratory:  Negative for cough and shortness of breath.    Cardiovascular:  Negative for chest pain.   Gastrointestinal:  Negative for abdominal pain and diarrhea.   Genitourinary:  Negative for frequency.   Musculoskeletal:  Negative for back pain.   Skin:  Negative for rash.   Neurological:  Negative for headaches.   Hematological:  Negative for adenopathy.   Psychiatric/Behavioral:  The patient is not nervous/anxious.      ECOG Performance Status: 0   Objective:     Vitals: Blood pressure (!) 140/73, pulse 75, temperature 98.3 °F (36.8 °C), temperature source Oral, resp. rate 20, height 5' 3" (1.6 m), weight 67.5 kg (148 lb 14.4 oz), SpO2 100 %.  BMI: 26.38 kg/m2    Physical Exam:   General: well-developed well-nourished in no acute distress  Eye: PERRL, EOMI, clear conjunctiva, eyelids normal  HENT: moist oropharynx. No petechiae.   Neck: no thyromegaly or lymphadenopathy  Respiratory: clear to auscultation bilaterally  Cardiovascular: regular rate and rhythm without murmurs, gallops or rubs  Gastrointestinal: soft, non-tender, non-distended with normal bowel sounds, without masses to palpation. No hepatosplenomegaly palpable.   Musculoskeletal: Good ROM to upper and lower extremities. No tenderness or swelling to bilateral hand joints. Hand  equal and strong.   Integumentary: no rashes or skin lesions present  Neurologic: cranial nerves intact, no signs of peripheral neurological deficit, motor/sensory function intact    Laboratory results:   01/18/2023: WBC 3.11, Hgb 11.4, , ANC1.6, potassium 3.4, " creatinine 0.81, iron 214, Sat % 69, ferritin 78, folate > 20.0, B-12 > 2000 7/20/2022: WBC 3.88, Hgb 11.9, plt 204, ANC 2.1, potassium 3.4, creatinine 0.80, calcium 8.7, iron 151, ferritin 96, folate >20.0, B-12 >2000    Imaging:     Assessment:   1. Leukopenia D72.819  1. It is certainly possible that the patient has leukopenia related to autoimmune disease(plaquenil) however this could also be a benign ethnic neutropenia.   She continues to feel well. WBC is stable. She has no history of infection or fever.   She continues to not be interested in having a bone marrow biopsy done.     2. Beta thalassemia Beta w/elevated HGB A2/F- Resolved anemia. Normal indices.  She is asymptomatic. Will continue to monitor.   Iron level is elevated, have advised her to decrease oral iron to one tablet daily. Continue B-12 daily.     3. Undifferentiated connective tissue disease M35.9   on Plaquenil. c/w follow-up visits with Dr Zacarias. He has her on oral iron, folate and Vit D.     4. Hypokalemia. Mild She does not take oral potassium. Is not on a diuretic.       She states she did not have a problem when she was able to eat a banana daily, but had to cut back due to her DM. Reviewed other foods high in potassium. Gave her a printed hand out.      5. Weight loss. Intentional.     Plan:     # RTC 6 months with MD,  CBC CMP folate, b-12, iron and ferritin done prior.       Perlita Devine, JOAQUINC

## 2023-02-13 ENCOUNTER — OFFICE VISIT (OUTPATIENT)
Dept: RHEUMATOLOGY | Facility: CLINIC | Age: 63
End: 2023-02-13
Payer: MEDICARE

## 2023-02-13 VITALS
SYSTOLIC BLOOD PRESSURE: 132 MMHG | HEIGHT: 63 IN | WEIGHT: 150.19 LBS | BODY MASS INDEX: 26.61 KG/M2 | HEART RATE: 76 BPM | TEMPERATURE: 99 F | DIASTOLIC BLOOD PRESSURE: 78 MMHG | OXYGEN SATURATION: 98 %

## 2023-02-13 DIAGNOSIS — M35.9 UNDIFFERENTIATED CONNECTIVE TISSUE DISEASE: Primary | ICD-10-CM

## 2023-02-13 DIAGNOSIS — D72.819 LEUKOPENIA, UNSPECIFIED TYPE: ICD-10-CM

## 2023-02-13 DIAGNOSIS — E53.8 FOLIC ACID DEFICIENCY: ICD-10-CM

## 2023-02-13 DIAGNOSIS — L65.9 HAIR LOSS: ICD-10-CM

## 2023-02-13 DIAGNOSIS — E55.9 VITAMIN D DEFICIENCY: ICD-10-CM

## 2023-02-13 DIAGNOSIS — D56.1: ICD-10-CM

## 2023-02-13 PROCEDURE — 99213 PR OFFICE/OUTPT VISIT, EST, LEVL III, 20-29 MIN: ICD-10-PCS | Mod: S$PBB,,, | Performed by: INTERNAL MEDICINE

## 2023-02-13 PROCEDURE — 99999 PR PBB SHADOW E&M-EST. PATIENT-LVL IV: CPT | Mod: PBBFAC,,, | Performed by: INTERNAL MEDICINE

## 2023-02-13 PROCEDURE — 99213 OFFICE O/P EST LOW 20 MIN: CPT | Mod: S$PBB,,, | Performed by: INTERNAL MEDICINE

## 2023-02-13 PROCEDURE — 99999 PR PBB SHADOW E&M-EST. PATIENT-LVL IV: ICD-10-PCS | Mod: PBBFAC,,, | Performed by: INTERNAL MEDICINE

## 2023-02-13 PROCEDURE — 99214 OFFICE O/P EST MOD 30 MIN: CPT | Mod: PBBFAC | Performed by: INTERNAL MEDICINE

## 2023-02-13 RX ORDER — HYDROXYCHLOROQUINE SULFATE 200 MG/1
200 TABLET, FILM COATED ORAL 2 TIMES DAILY
Qty: 180 TABLET | Refills: 3 | Status: SHIPPED | OUTPATIENT
Start: 2023-02-13 | End: 2023-06-14 | Stop reason: SDUPTHER

## 2023-02-13 RX ORDER — PANTOPRAZOLE SODIUM 40 MG/1
40 TABLET, DELAYED RELEASE ORAL 2 TIMES DAILY
Qty: 90 TABLET | Refills: 3 | Status: SHIPPED | OUTPATIENT
Start: 2023-02-13

## 2023-02-13 RX ORDER — FOLIC ACID-PYRIDOXINE-CYANOCOBALAMIN TAB 2.5-25-2 MG 2.5-25-2 MG
1 TAB ORAL EVERY MORNING
Qty: 90 TABLET | Refills: 3 | Status: SHIPPED | OUTPATIENT
Start: 2023-02-13 | End: 2023-06-14 | Stop reason: SDUPTHER

## 2023-02-13 NOTE — PROGRESS NOTES
"Subjective:       Patient ID: Robyn Hong is a 62 y.o. female.    Chief Complaint: Follow-up (FOLLOW UP. NO COMPLAINTS)    The patient is complaining of joint pain involving the MCP PIP wrist elbow shoulders hips knees and ankles bilaterally.  The pain is 1/10 in intensity dull in quality and continuous.  That is associated with a morning stiffness lasting for LESS  than 60 minutes.  Is also having difficulty maintaining a good night of sleep.  This has been associated with myalgias.  Muscle aches are 1/10 in intensity dull in quality and continuous.  They are associated with fatigue.  No fever no chills no others.      Review of Systems   Constitutional:  Negative for appetite change, chills and fever.   HENT:  Negative for congestion, ear pain, mouth sores, nosebleeds and trouble swallowing.    Eyes:  Negative for photophobia and discharge.   Respiratory:  Negative for chest tightness and shortness of breath.    Cardiovascular:  Negative for chest pain.   Gastrointestinal:  Negative for abdominal pain and vomiting.   Endocrine: Negative.    Genitourinary:  Negative for hematuria.   Musculoskeletal:         As per HPI   Skin:  Negative for rash.   Neurological:  Negative for weakness.       Objective:   /78 (BP Location: Right arm, Patient Position: Sitting, BP Method: Medium (Automatic))   Pulse 76   Temp 98.6 °F (37 °C) (Oral)   Ht 5' 3" (1.6 m)   Wt 68.1 kg (150 lb 3.2 oz)   SpO2 98%   BMI 26.61 kg/m²      Physical Exam   Constitutional: She is oriented to person, place, and time. She appears well-developed and well-nourished. No distress.   HENT:   Head: Normocephalic and atraumatic.   Right Ear: External ear normal.   Left Ear: External ear normal.   Eyes: Pupils are equal, round, and reactive to light.   Cardiovascular: Normal rate, regular rhythm and normal heart sounds.   Pulmonary/Chest: Breath sounds normal.   Abdominal: Soft. There is no abdominal tenderness.   Musculoskeletal:      Right " shoulder: Normal.      Left shoulder: Normal.      Right elbow: Normal.      Left elbow: Normal.      Right wrist: Normal.      Left wrist: Normal.      Cervical back: Neck supple.      Right hip: Normal.      Left hip: Normal.      Right knee: Normal.      Left knee: Normal.   Lymphadenopathy:     She has no cervical adenopathy.   Neurological: She is alert and oriented to person, place, and time. She displays normal reflexes. No cranial nerve deficit or sensory deficit. She exhibits normal muscle tone. Coordination normal.   Skin: No rash noted. No erythema.   Vitals reviewed.      Right Side Rheumatological Exam     Examination finds the shoulder, elbow, wrist, knee, 1st PIP, 1st MCP, 2nd PIP, 2nd MCP, 3rd PIP, 3rd MCP, 4th PIP, 4th MCP, 5th PIP, 5th MCP, temporomandibular, hip, ankle, 1st MTP, 2nd MTP, 3rd MTP, 4th MTP and 5th MTP normal.    Left Side Rheumatological Exam     Examination finds the shoulder, elbow, wrist, knee, 1st PIP, 1st MCP, 2nd PIP, 2nd MCP, 3rd PIP, 3rd MCP, 4th PIP, 4th MCP, 5th PIP, 5th MCP, temporomandibular, hip, ankle, 1st MTP, 2nd MTP, 3rd MTP, 4th MTP and 5th MTP normal.       Completed Fibromyalgia exam 8/18 tender points.  No data to display     Assessment:       1. Undifferentiated connective tissue disease    2. Homozygous beta thalassemia    3. Leukopenia, unspecified type    4. Vitamin D deficiency    5. Folic acid deficiency    6. Hair loss            Medication List with Changes/Refills   Current Medications    ALBUTEROL (PROVENTIL/VENTOLIN HFA) 90 MCG/ACTUATION INHALER    Inhale 2 puffs into the lungs 2 (two) times daily as needed.       Start Date: --        End Date: --    AMLODIPINE (NORVASC) 10 MG TABLET    once daily.       Start Date: 9/5/2022  End Date: --    ASPIRIN (ECOTRIN) 81 MG EC TABLET    Take 81 mg by mouth once daily.       Start Date: --        End Date: --    ATENOLOL (TENORMIN) 50 MG TABLET    Take 50 mg by mouth once daily.       Start Date: 5/23/2022 End  Date: --    ATORVASTATIN (LIPITOR) 10 MG TABLET    Take 10 mg by mouth once daily.       Start Date: --        End Date: --    CLOPIDOGREL (PLAVIX) 75 MG TABLET    Take 75 mg by mouth once daily.       Start Date: 5/9/2022  End Date: --    ENALAPRIL (VASOTEC) 5 MG TABLET    Take 5 mg by mouth once daily.       Start Date: 5/23/2022 End Date: --    FEROSUL 325 MG (65 MG IRON) TAB TABLET    Take 65 mg by mouth once daily.       Start Date: 3/15/2022 End Date: --    FLUTICASONE PROPIONATE (FLONASE) 50 MCG/ACTUATION NASAL SPRAY    1 spray by Each Nostril route once daily.       Start Date: 9/21/2022 End Date: --    LORATADINE (CLARITIN) 10 MG TABLET    Take 10 mg by mouth once daily.       Start Date: 5/23/2022 End Date: --    MULTIVIT MIN-IRON-FA-HERB 186 (HAIR, SKIN AND NAILS ADVANCED) 3.3 MG IRON-25 MCG TAB    Take 1 capsule by mouth once daily.       Start Date: 5/31/2022 End Date: --    TRUE METRIX GLUCOSE TEST STRIP STRP    1 strip once daily.       Start Date: 4/26/2022 End Date: --    TRUEPLUS LANCETS 28 GAUGE MISC    Apply topically once daily.       Start Date: 6/27/2022 End Date: --   Changed and/or Refilled Medications    Modified Medication Previous Medication    FOLBIC 2.5-25-2 MG TAB FOLBIC 2.5-25-2 mg Tab       Take 1 tablet by mouth every morning.    Take 1 tablet by mouth every morning.       Start Date: 2/13/2023 End Date: --    Start Date: 9/12/2022 End Date: 2/13/2023    HYDROXYCHLOROQUINE (PLAQUENIL) 200 MG TABLET hydrOXYchloroQUINE (PLAQUENIL) 200 mg tablet       Take 1 tablet (200 mg total) by mouth 2 (two) times daily.    Take 1 tablet (200 mg total) by mouth 2 (two) times daily.       Start Date: 2/13/2023 End Date: --    Start Date: 9/12/2022 End Date: 2/13/2023    PANTOPRAZOLE (PROTONIX) 40 MG TABLET pantoprazole (PROTONIX) 40 MG tablet       Take 1 tablet (40 mg total) by mouth 2 (two) times daily.    Take 1 tablet (40 mg total) by mouth 2 (two) times daily.       Start Date: 2/13/2023 End  Date: --    Start Date: 9/12/2022 End Date: 2/13/2023         Plan:         Problem List Items Addressed This Visit          Immunology/Multi System    Undifferentiated connective tissue disease - Primary    Relevant Medications    FOLBIC 2.5-25-2 mg Tab    hydrOXYchloroQUINE (PLAQUENIL) 200 mg tablet    pantoprazole (PROTONIX) 40 MG tablet       Oncology    Homozygous beta thalassemia    Relevant Medications    FOLBIC 2.5-25-2 mg Tab    hydrOXYchloroQUINE (PLAQUENIL) 200 mg tablet    pantoprazole (PROTONIX) 40 MG tablet    Leukopenia    Relevant Medications    FOLBIC 2.5-25-2 mg Tab    hydrOXYchloroQUINE (PLAQUENIL) 200 mg tablet    pantoprazole (PROTONIX) 40 MG tablet       Endocrine    Vitamin D deficiency    Relevant Medications    FOLBIC 2.5-25-2 mg Tab    hydrOXYchloroQUINE (PLAQUENIL) 200 mg tablet    pantoprazole (PROTONIX) 40 MG tablet    Folic acid deficiency    Relevant Medications    FOLBIC 2.5-25-2 mg Tab    hydrOXYchloroQUINE (PLAQUENIL) 200 mg tablet    pantoprazole (PROTONIX) 40 MG tablet     Other Visit Diagnoses       Hair loss        Relevant Medications    FOLBIC 2.5-25-2 mg Tab    hydrOXYchloroQUINE (PLAQUENIL) 200 mg tablet    pantoprazole (PROTONIX) 40 MG tablet

## 2023-04-04 ENCOUNTER — PATIENT MESSAGE (OUTPATIENT)
Dept: RESEARCH | Facility: HOSPITAL | Age: 63
End: 2023-04-04
Payer: MEDICARE

## 2023-06-14 ENCOUNTER — OFFICE VISIT (OUTPATIENT)
Dept: RHEUMATOLOGY | Facility: CLINIC | Age: 63
End: 2023-06-14
Payer: MEDICARE

## 2023-06-14 VITALS
BODY MASS INDEX: 26.34 KG/M2 | DIASTOLIC BLOOD PRESSURE: 70 MMHG | OXYGEN SATURATION: 99 % | HEART RATE: 62 BPM | RESPIRATION RATE: 18 BRPM | HEIGHT: 63 IN | SYSTOLIC BLOOD PRESSURE: 160 MMHG | WEIGHT: 148.63 LBS | TEMPERATURE: 99 F

## 2023-06-14 DIAGNOSIS — L65.9 HAIR LOSS: ICD-10-CM

## 2023-06-14 DIAGNOSIS — I73.9 PAD (PERIPHERAL ARTERY DISEASE): ICD-10-CM

## 2023-06-14 DIAGNOSIS — M35.9 UNDIFFERENTIATED CONNECTIVE TISSUE DISEASE: Primary | ICD-10-CM

## 2023-06-14 DIAGNOSIS — I10 HYPERTENSION, UNSPECIFIED TYPE: ICD-10-CM

## 2023-06-14 DIAGNOSIS — D56.1: ICD-10-CM

## 2023-06-14 DIAGNOSIS — D72.819 LEUKOPENIA, UNSPECIFIED TYPE: ICD-10-CM

## 2023-06-14 DIAGNOSIS — E55.9 VITAMIN D DEFICIENCY: ICD-10-CM

## 2023-06-14 DIAGNOSIS — E53.8 FOLIC ACID DEFICIENCY: ICD-10-CM

## 2023-06-14 PROCEDURE — 99214 OFFICE O/P EST MOD 30 MIN: CPT | Mod: S$PBB,,, | Performed by: INTERNAL MEDICINE

## 2023-06-14 PROCEDURE — 99999 PR PBB SHADOW E&M-EST. PATIENT-LVL V: ICD-10-PCS | Mod: PBBFAC,,, | Performed by: INTERNAL MEDICINE

## 2023-06-14 PROCEDURE — 99215 OFFICE O/P EST HI 40 MIN: CPT | Mod: PBBFAC | Performed by: INTERNAL MEDICINE

## 2023-06-14 PROCEDURE — 99999 PR PBB SHADOW E&M-EST. PATIENT-LVL V: CPT | Mod: PBBFAC,,, | Performed by: INTERNAL MEDICINE

## 2023-06-14 PROCEDURE — 99214 PR OFFICE/OUTPT VISIT, EST, LEVL IV, 30-39 MIN: ICD-10-PCS | Mod: S$PBB,,, | Performed by: INTERNAL MEDICINE

## 2023-06-14 RX ORDER — LATANOPROST 50 UG/ML
1 SOLUTION/ DROPS OPHTHALMIC NIGHTLY
COMMUNITY
Start: 2023-05-31

## 2023-06-14 RX ORDER — FOLIC ACID-PYRIDOXINE-CYANOCOBALAMIN TAB 2.5-25-2 MG 2.5-25-2 MG
1 TAB ORAL EVERY MORNING
Qty: 90 TABLET | Refills: 3 | Status: SHIPPED | OUTPATIENT
Start: 2023-06-14

## 2023-06-14 RX ORDER — HYDROCHLOROTHIAZIDE 25 MG/1
25 TABLET ORAL EVERY MORNING
COMMUNITY
Start: 2023-05-17

## 2023-06-14 RX ORDER — ATORVASTATIN CALCIUM 20 MG/1
20 TABLET, FILM COATED ORAL DAILY
COMMUNITY
Start: 2023-05-16

## 2023-06-14 RX ORDER — GABAPENTIN 100 MG/1
100 CAPSULE ORAL 2 TIMES DAILY
Qty: 180 CAPSULE | Refills: 3 | Status: SHIPPED | OUTPATIENT
Start: 2023-06-14 | End: 2023-07-26 | Stop reason: SDUPTHER

## 2023-06-14 RX ORDER — HYDROXYCHLOROQUINE SULFATE 200 MG/1
200 TABLET, FILM COATED ORAL 2 TIMES DAILY
Qty: 180 TABLET | Refills: 3 | Status: SHIPPED | OUTPATIENT
Start: 2023-06-14

## 2023-06-14 NOTE — PROGRESS NOTES
"Subjective:       Patient ID: Robyn Hong is a 62 y.o. female.    Chief Complaint: Follow-up (Patient feels that the plaquenil isn't helping . Pain on the outer sides of bilateral feet. Right shoulder pain )    The patient is complaining of joint pain involving the MCP PIP wrist elbow shoulders hips knees and ankles bilaterally.  The pain is 2/10 in intensity dull in quality and continuous.  That is associated with a morning stiffness lasting for less than 60 minutes.  Is also having difficulty maintaining a good night of sleep.  This has been associated with myalgias.  Muscle aches are 5/10 in intensity dull in quality and continuous.  They are associated with fatigue.  No fever no chills no others.  Labs checked during this visit       Review of Systems   Constitutional:  Negative for appetite change, chills and fever.   HENT:  Negative for congestion, ear pain, mouth sores, nosebleeds and trouble swallowing.    Eyes:  Negative for photophobia and discharge.   Respiratory:  Negative for chest tightness and shortness of breath.    Cardiovascular:  Negative for chest pain.   Gastrointestinal:  Negative for abdominal pain and vomiting.   Endocrine: Negative.    Genitourinary:  Negative for hematuria.   Musculoskeletal:         As per HPI   Skin:  Negative for rash.   Neurological:  Negative for weakness.       Objective:   BP (!) 160/70 (BP Location: Left arm, Patient Position: Sitting, BP Method: Medium (Manual))   Pulse 62   Temp 98.9 °F (37.2 °C) (Oral)   Resp 18   Ht 5' 3" (1.6 m)   Wt 67.4 kg (148 lb 9.6 oz)   SpO2 99%   BMI 26.32 kg/m²      Physical Exam   Constitutional: She is oriented to person, place, and time. She appears well-developed and well-nourished. No distress.   HENT:   Head: Normocephalic and atraumatic.   Right Ear: External ear normal.   Left Ear: External ear normal.   Eyes: Pupils are equal, round, and reactive to light.   Cardiovascular: Normal rate, regular rhythm and normal heart " sounds.   Pulmonary/Chest: Breath sounds normal.   Abdominal: Soft. There is no abdominal tenderness.   Musculoskeletal:      Right shoulder: Normal.      Left shoulder: Normal.      Right elbow: Normal.      Left elbow: Normal.      Right wrist: Normal.      Left wrist: Normal.      Cervical back: Neck supple.      Right hip: Normal.      Left hip: Normal.      Right knee: Normal.      Left knee: Normal.   Lymphadenopathy:     She has no cervical adenopathy.   Neurological: She is alert and oriented to person, place, and time. She displays normal reflexes. No cranial nerve deficit or sensory deficit. She exhibits normal muscle tone. Coordination normal.   Skin: No rash noted. No erythema.   Vitals reviewed.      Right Side Rheumatological Exam     Examination finds the shoulder, elbow, wrist, knee, 1st PIP, 1st MCP, 2nd PIP, 2nd MCP, 3rd PIP, 3rd MCP, 4th PIP, 4th MCP, 5th PIP, 5th MCP, temporomandibular, hip, ankle, 1st MTP, 2nd MTP, 3rd MTP, 4th MTP and 5th MTP normal.    Left Side Rheumatological Exam     Examination finds the shoulder, elbow, wrist, knee, 1st PIP, 1st MCP, 2nd PIP, 2nd MCP, 3rd PIP, 3rd MCP, 4th PIP, 4th MCP, 5th PIP, 5th MCP, temporomandibular, hip, ankle, 1st MTP, 2nd MTP, 3rd MTP, 4th MTP and 5th MTP normal.       Completed Fibromyalgia exam 18/18 tender points.  No data to display     Assessment:       1. Undifferentiated connective tissue disease    2. Homozygous beta thalassemia    3. Vitamin D deficiency    4. Leukopenia, unspecified type    5. Folic acid deficiency    6. PAD (peripheral artery disease)    7. Hypertension, unspecified type    8. Hair loss          Medication List with Changes/Refills   New Medications    GABAPENTIN (NEURONTIN) 100 MG CAPSULE    Take 1 capsule (100 mg total) by mouth 2 (two) times daily.       Start Date: 6/14/2023 End Date: 6/13/2024   Current Medications    ALBUTEROL (PROVENTIL/VENTOLIN HFA) 90 MCG/ACTUATION INHALER    Inhale 2 puffs into the lungs 2  (two) times daily as needed.       Start Date: --        End Date: --    AMLODIPINE (NORVASC) 10 MG TABLET    Take 10 mg by mouth once daily.       Start Date: 9/5/2022  End Date: --    ASPIRIN (ECOTRIN) 81 MG EC TABLET    Take 81 mg by mouth once daily.       Start Date: --        End Date: --    ATENOLOL (TENORMIN) 50 MG TABLET    Take 50 mg by mouth once daily.       Start Date: 5/23/2022 End Date: --    ATORVASTATIN (LIPITOR) 20 MG TABLET    Take 20 mg by mouth once daily.       Start Date: 5/16/2023 End Date: --    CLOPIDOGREL (PLAVIX) 75 MG TABLET    Take 75 mg by mouth once daily.       Start Date: 5/9/2022  End Date: --    ENALAPRIL (VASOTEC) 5 MG TABLET    Take 5 mg by mouth once daily.       Start Date: 5/23/2022 End Date: --    FEROSUL 325 MG (65 MG IRON) TAB TABLET    Take 65 mg by mouth once daily.       Start Date: 3/15/2022 End Date: --    FLUTICASONE PROPIONATE (FLONASE) 50 MCG/ACTUATION NASAL SPRAY    1 spray by Each Nostril route once daily.       Start Date: 9/21/2022 End Date: --    HYDROCHLOROTHIAZIDE (HYDRODIURIL) 25 MG TABLET    Take 25 mg by mouth every morning.       Start Date: 5/17/2023 End Date: --    LATANOPROST 0.005 % OPHTHALMIC SOLUTION    Place 1 drop into both eyes every evening.       Start Date: 5/31/2023 End Date: --    LORATADINE (CLARITIN) 10 MG TABLET    Take 10 mg by mouth once daily.       Start Date: 5/23/2022 End Date: --    PANTOPRAZOLE (PROTONIX) 40 MG TABLET    Take 1 tablet (40 mg total) by mouth 2 (two) times daily.       Start Date: 2/13/2023 End Date: --    TRUE METRIX GLUCOSE TEST STRIP STRP    1 strip once daily.       Start Date: 4/26/2022 End Date: --    TRUEPLUS LANCETS 28 GAUGE MISC    Apply topically once daily.       Start Date: 6/27/2022 End Date: --   Changed and/or Refilled Medications    Modified Medication Previous Medication    FOLBIC 2.5-25-2 MG TAB FOLBIC 2.5-25-2 mg Tab       Take 1 tablet by mouth every morning.    Take 1 tablet by mouth every  morning.       Start Date: 6/14/2023 End Date: --    Start Date: 2/13/2023 End Date: 6/14/2023    HYDROXYCHLOROQUINE (PLAQUENIL) 200 MG TABLET hydrOXYchloroQUINE (PLAQUENIL) 200 mg tablet       Take 1 tablet (200 mg total) by mouth 2 (two) times daily.    Take 1 tablet (200 mg total) by mouth 2 (two) times daily.       Start Date: 6/14/2023 End Date: --    Start Date: 2/13/2023 End Date: 6/14/2023   Discontinued Medications    ATORVASTATIN (LIPITOR) 10 MG TABLET    Take 10 mg by mouth once daily.       Start Date: --        End Date: 6/14/2023    MULTIVIT MIN-IRON-FA-HERB 186 (HAIR, SKIN AND NAILS ADVANCED) 3.3 MG IRON-25 MCG TAB    Take 1 capsule by mouth once daily.       Start Date: 5/31/2022 End Date: 6/14/2023         Plan:         Problem List Items Addressed This Visit          Cardiac/Vascular    Hypertension    Relevant Medications    FOLBIC 2.5-25-2 mg Tab    hydrOXYchloroQUINE (PLAQUENIL) 200 mg tablet    gabapentin (NEURONTIN) 100 MG capsule    PAD (peripheral artery disease)    Relevant Medications    FOLBIC 2.5-25-2 mg Tab    hydrOXYchloroQUINE (PLAQUENIL) 200 mg tablet    gabapentin (NEURONTIN) 100 MG capsule       Immunology/Multi System    Undifferentiated connective tissue disease - Primary    Relevant Medications    FOLBIC 2.5-25-2 mg Tab    hydrOXYchloroQUINE (PLAQUENIL) 200 mg tablet    gabapentin (NEURONTIN) 100 MG capsule       Oncology    Homozygous beta thalassemia    Relevant Medications    FOLBIC 2.5-25-2 mg Tab    hydrOXYchloroQUINE (PLAQUENIL) 200 mg tablet    gabapentin (NEURONTIN) 100 MG capsule    Leukopenia    Relevant Medications    FOLBIC 2.5-25-2 mg Tab    hydrOXYchloroQUINE (PLAQUENIL) 200 mg tablet    gabapentin (NEURONTIN) 100 MG capsule       Endocrine    Vitamin D deficiency    Relevant Medications    FOLBIC 2.5-25-2 mg Tab    hydrOXYchloroQUINE (PLAQUENIL) 200 mg tablet    gabapentin (NEURONTIN) 100 MG capsule    Folic acid deficiency    Relevant Medications    FOLBIC  2.5-25-2 mg Tab    hydrOXYchloroQUINE (PLAQUENIL) 200 mg tablet    gabapentin (NEURONTIN) 100 MG capsule     Other Visit Diagnoses       Hair loss        Relevant Medications    FOLBIC 2.5-25-2 mg Tab    hydrOXYchloroQUINE (PLAQUENIL) 200 mg tablet    gabapentin (NEURONTIN) 100 MG capsule

## 2023-07-26 ENCOUNTER — TELEPHONE (OUTPATIENT)
Dept: RHEUMATOLOGY | Facility: CLINIC | Age: 63
End: 2023-07-26
Payer: MEDICARE

## 2023-07-26 DIAGNOSIS — E55.9 VITAMIN D DEFICIENCY: ICD-10-CM

## 2023-07-26 DIAGNOSIS — D56.1: ICD-10-CM

## 2023-07-26 DIAGNOSIS — L65.9 HAIR LOSS: ICD-10-CM

## 2023-07-26 DIAGNOSIS — I10 HYPERTENSION, UNSPECIFIED TYPE: ICD-10-CM

## 2023-07-26 DIAGNOSIS — E53.8 FOLIC ACID DEFICIENCY: ICD-10-CM

## 2023-07-26 DIAGNOSIS — D72.819 LEUKOPENIA, UNSPECIFIED TYPE: ICD-10-CM

## 2023-07-26 DIAGNOSIS — I73.9 PAD (PERIPHERAL ARTERY DISEASE): ICD-10-CM

## 2023-07-26 DIAGNOSIS — M35.9 UNDIFFERENTIATED CONNECTIVE TISSUE DISEASE: ICD-10-CM

## 2023-07-26 RX ORDER — GABAPENTIN 100 MG/1
200 CAPSULE ORAL 2 TIMES DAILY
Qty: 180 CAPSULE | Refills: 3 | Status: SHIPPED | OUTPATIENT
Start: 2023-07-26 | End: 2024-07-25

## 2023-07-26 NOTE — TELEPHONE ENCOUNTER
----- Message from Sydnee Dubon sent at 7/26/2023 11:13 AM CDT -----  Regarding: Medication  Patient called stating the Arthritis medication prescribed is not working. Is there an alternative. Please Advise.

## 2023-07-26 NOTE — TELEPHONE ENCOUNTER
We should keep the hydroxchloroquine and can add a medication called methotrexate. It is 4 tablets once per week. Fredy is on her medication list. Please make sure she continues taking this medication because it has folic acid in it. The methotrexate needs the folic acid. A possible side effect could be upset stomach/diarrhea which typically goes away after a few doses. If she is amicable I can send it to her pharmacy.    In addition, since she is at starting dose, I increased her gabapentin to 200 mg BID to help with her radiating pain. Rx sent to pharmacy    Lastly,  I ordered labs for her to complete before her next visit with Dr. Zacarias.    Thanks

## 2023-07-26 NOTE — TELEPHONE ENCOUNTER
Patient states that she feels like the Hydroxychloroquine and Gabapentin are not working for her . From her hip on her right side down to her knee is hurting when she walks. She is wondering if there is something else she can take.

## 2023-07-27 ENCOUNTER — TELEPHONE (OUTPATIENT)
Dept: RHEUMATOLOGY | Facility: CLINIC | Age: 63
End: 2023-07-27
Payer: MEDICARE

## 2023-07-27 DIAGNOSIS — M35.9 UNDIFFERENTIATED CONNECTIVE TISSUE DISEASE: Primary | ICD-10-CM

## 2023-07-27 RX ORDER — METHOTREXATE 2.5 MG/1
10 TABLET ORAL
Qty: 20 TABLET | Refills: 5 | Status: SHIPPED | OUTPATIENT
Start: 2023-07-27

## 2023-07-27 RX ORDER — FOLIC ACID 1 MG/1
1 TABLET ORAL DAILY
Qty: 30 TABLET | Refills: 5 | Status: SHIPPED | OUTPATIENT
Start: 2023-07-27 | End: 2023-08-01

## 2023-07-27 NOTE — TELEPHONE ENCOUNTER
Pt advised and verbally understands. She is fine with going ahead and taking the methotrexate. You can send that to her pharmacy. She request labs to be faxed to [facility]; I advised her I would fax them over.

## 2023-07-27 NOTE — TELEPHONE ENCOUNTER
----- Message from Sydnee Dubon sent at 7/27/2023  1:09 PM CDT -----  Regarding: Medication  Patient called to find out if the Methotrexate was sent to [facility] in [place]. She will no longer take the Gabapentin.

## 2023-07-27 NOTE — TELEPHONE ENCOUNTER
The methotrexate was not sent in. Can you please send it to her pharmacy. Please see last telephone note.

## 2023-07-28 NOTE — TELEPHONE ENCOUNTER
Rx for methotrexate sent to her pharmacy. She SHOULD continue the gabapentin. The methotrexate replaces the hydroxychloroquine.  Thank you

## 2023-08-01 ENCOUNTER — TELEPHONE (OUTPATIENT)
Dept: RHEUMATOLOGY | Facility: CLINIC | Age: 63
End: 2023-08-01
Payer: MEDICARE

## 2023-08-01 NOTE — TELEPHONE ENCOUNTER
----- Message from Sydnee Dubon sent at 8/1/2023  1:36 PM CDT -----  Regarding: Medication  Patient called needing clarification on whether she is able to take the Folic Acid and Folbic together. Please return call @ [phone]

## 2023-08-01 NOTE — TELEPHONE ENCOUNTER
She does not need to take the folic acid, the folbic has the folic acid in it. I will remove folic acid from her medication list.

## 2023-08-11 DIAGNOSIS — Z12.31 ENCOUNTER FOR SCREENING MAMMOGRAM FOR MALIGNANT NEOPLASM OF BREAST: Primary | ICD-10-CM

## 2023-09-20 ENCOUNTER — HOSPITAL ENCOUNTER (OUTPATIENT)
Dept: RADIOLOGY | Facility: HOSPITAL | Age: 63
Discharge: HOME | End: 2023-09-20
Attending: NURSE PRACTITIONER
Payer: MEDICARE

## 2023-09-20 DIAGNOSIS — Z12.31 ENCOUNTER FOR SCREENING MAMMOGRAM FOR MALIGNANT NEOPLASM OF BREAST: ICD-10-CM

## 2023-09-20 PROCEDURE — 77063 BREAST TOMOSYNTHESIS BI: CPT | Mod: 26,,, | Performed by: RADIOLOGY

## 2023-09-20 PROCEDURE — 77067 MAMMO DIGITAL SCREENING BILAT WITH TOMO: ICD-10-PCS | Mod: 26,,, | Performed by: RADIOLOGY

## 2023-09-20 PROCEDURE — 77067 SCR MAMMO BI INCL CAD: CPT | Mod: 26,,, | Performed by: RADIOLOGY

## 2023-09-20 PROCEDURE — 77067 SCR MAMMO BI INCL CAD: CPT | Mod: TC

## 2023-09-20 PROCEDURE — 77063 MAMMO DIGITAL SCREENING BILAT WITH TOMO: ICD-10-PCS | Mod: 26,,, | Performed by: RADIOLOGY

## 2023-10-12 ENCOUNTER — OFFICE VISIT (OUTPATIENT)
Dept: GYNECOLOGY | Facility: CLINIC | Age: 63
End: 2023-10-12
Payer: MEDICARE

## 2023-10-12 VITALS
HEART RATE: 66 BPM | OXYGEN SATURATION: 100 % | RESPIRATION RATE: 20 BRPM | WEIGHT: 143.38 LBS | DIASTOLIC BLOOD PRESSURE: 74 MMHG | TEMPERATURE: 98 F | BODY MASS INDEX: 25.41 KG/M2 | SYSTOLIC BLOOD PRESSURE: 152 MMHG | HEIGHT: 63 IN

## 2023-10-12 DIAGNOSIS — Z72.0 TOBACCO USE: ICD-10-CM

## 2023-10-12 DIAGNOSIS — Z01.419 ENCOUNTER FOR ANNUAL ROUTINE GYNECOLOGICAL EXAMINATION: Primary | ICD-10-CM

## 2023-10-12 PROCEDURE — G0101 CA SCREEN;PELVIC/BREAST EXAM: HCPCS | Mod: S$PBB,,, | Performed by: NURSE PRACTITIONER

## 2023-10-12 PROCEDURE — 99215 OFFICE O/P EST HI 40 MIN: CPT | Mod: PBBFAC | Performed by: NURSE PRACTITIONER

## 2023-10-12 PROCEDURE — G0101 CA SCREEN;PELVIC/BREAST EXAM: HCPCS | Mod: PBBFAC | Performed by: NURSE PRACTITIONER

## 2023-10-12 PROCEDURE — G0101 PR CA SCREEN;PELVIC/BREAST EXAM: ICD-10-PCS | Mod: S$PBB,,, | Performed by: NURSE PRACTITIONER

## 2023-10-12 RX ORDER — LISINOPRIL 10 MG/1
10 TABLET ORAL DAILY
COMMUNITY

## 2023-10-12 RX ORDER — ENALAPRIL MALEATE 10 MG/1
TABLET ORAL
COMMUNITY
Start: 2023-09-19

## 2023-10-12 NOTE — PROGRESS NOTES
"  Subjective:       Patient ID: Robyn Hong is a 62 y.o. female.    Chief Complaint:  Gynecologic Exam    History of Present Illness  Pt is a  here for annual GYN exam. Denies hx of cervical dysplasia. Last pap -NIL, HPV negative. She is postmenopausal since . Denies hot flashes. Denies vaginal bleeding, itching, or discharge. Denies any abdominal or pelvic pain. Hx of left breast lumpectomy x 3. She reports that all were benign. MG-23-BIRADS 1. Denies any breast or urinary complaints. Denies any family hx of breast, uterine, or ovarian cancer. Admits tobacco use. PCP-Latisha Jenkins at [facility]. Colonoscopy with polypectomy x2 in 10/2019, repeat in  per pt. No complaints today.    GYN & OB History  No LMP recorded. Patient is postmenopausal.   Date of Last Pap: 10/11/2022    Review of patient's allergies indicates:   Allergen Reactions    Codeine      Other reaction(s): Indomethacin  The patient reports it makes her feel bad and pass out      Indomethacin      The pt reports it makes her feel bad and pass out       Past Medical History:   Diagnosis Date    Abnormal Pap smear of cervix     Diabetes mellitus     Hyperlipidemia     Hypertension      OB History    Para Term  AB Living   1 1           SAB IAB Ectopic Multiple Live Births                  # Outcome Date GA Lbr Kevin/2nd Weight Sex Delivery Anes PTL Lv   1 Para                 Review of Systems  Review of Systems    Negative except for pertinent findings for positives per HPI     Objective:    Physical Exam    BP (!) 152/74 (BP Location: Right arm, Patient Position: Sitting, BP Method: Medium (Automatic))   Pulse 66   Temp 97.8 °F (36.6 °C) (Oral)   Resp 20   Ht 5' 3" (1.6 m)   Wt 65 kg (143 lb 6.4 oz)   SpO2 100%   BMI 25.40 kg/m²   GENERAL: Well-developed female. No acute distress.    SKIN: Normal to inspection, warm and intact.  BREASTS: No rashes or erythema. No masses, lumps, " discharge, tenderness.  ABDOMEN: Soft, non tender.  VULVA: General appearance normal; external genitalia with no lesions or erythema.  VAGINA: Mucosa/vaginal vault pale, no abnormal discharge or lesions.  CERVIX: Pale, nulliparous appearing os, no erythema or abnormal discharge.  BIMANUAL EXAM: reveals 8 week-sized uterus. The uterus is non tender. Brice adnexa reveal no tenderness.  PSYCHIATRIC: Patient is oriented to person, place, and time. Mood and affect are normal.      Assessment:         1. Encounter for annual routine gynecological examination    2. Tobacco use         Plan:   Robyn was seen today for gynecologic exam.    Diagnoses and all orders for this visit:    Encounter for annual routine gynecological examination    Tobacco use    Pelvic today, pap utd per ACOG  Smoking cessation counseling provided. Instructed on smoking cessation program through [facility] and pharmacological interventions to aid in cessation.  Follow up in about 1 year (around 10/12/2024) for annual exam.

## 2024-04-01 ENCOUNTER — DOCUMENTATION ONLY (OUTPATIENT)
Dept: RHEUMATOLOGY | Facility: CLINIC | Age: 64
End: 2024-04-01
Payer: MEDICARE

## 2024-04-01 NOTE — PROGRESS NOTES
Certified letter _[id number]_ mailed to patient in regard to Rheumatology provider  from organization/ medication refills. Letter returned due to _Robyn Hong_ incorrect address etc.

## 2024-10-17 ENCOUNTER — OFFICE VISIT (OUTPATIENT)
Dept: GYNECOLOGY | Facility: CLINIC | Age: 64
End: 2024-10-17
Payer: MEDICARE

## 2024-10-17 VITALS
SYSTOLIC BLOOD PRESSURE: 148 MMHG | OXYGEN SATURATION: 100 % | RESPIRATION RATE: 20 BRPM | DIASTOLIC BLOOD PRESSURE: 64 MMHG | TEMPERATURE: 98 F | HEART RATE: 60 BPM | HEIGHT: 63 IN | BODY MASS INDEX: 25.05 KG/M2 | WEIGHT: 141.38 LBS

## 2024-10-17 DIAGNOSIS — Z12.31 VISIT FOR SCREENING MAMMOGRAM: ICD-10-CM

## 2024-10-17 DIAGNOSIS — Z12.4 PAP SMEAR FOR CERVICAL CANCER SCREENING: Primary | ICD-10-CM

## 2024-10-17 PROCEDURE — G0101 CA SCREEN;PELVIC/BREAST EXAM: HCPCS | Mod: PBBFAC | Performed by: NURSE PRACTITIONER

## 2024-10-17 PROCEDURE — 99215 OFFICE O/P EST HI 40 MIN: CPT | Mod: PBBFAC | Performed by: NURSE PRACTITIONER

## 2024-10-17 NOTE — PROGRESS NOTES
"  [facility] -  Gynecology / Women's Health Clinic      Subjective:       Patient ID: Robyn Hong is a 63 y.o. female.    Chief Complaint:  Gynecologic Exam    History of Present Illness  Pt is a  here for annual GYN exam. Denies hx of cervical dysplasia. Last pap -NIL, HPV negative. She is postmenopausal since . Denies hot flashes. Denies vaginal bleeding, itching, or discharge. Denies any abdominal or pelvic pain. Hx of left breast lumpectomy x 3. She reports that all were benign. MG-23-BIRADS 1. Denies any breast or urinary complaints. Denies any family hx of breast, uterine, or ovarian cancer. Admits tobacco use. PCP-Latisha Jenkins at [facility]. Colonoscopy with polypectomy x2 in 10/2019, repeat scheduled in a few months at [facility]. No complaints today.     GYN & OB History  No LMP recorded. Patient is postmenopausal.   Date of Last Pap: 10/11/2022    Review of patient's allergies indicates:   Allergen Reactions    Codeine      Other reaction(s): Indomethacin  The patient reports it makes her feel bad and pass out      Indomethacin      The pt reports it makes her feel bad and pass out       Past Medical History:   Diagnosis Date    Abnormal Pap smear of cervix     Diabetes mellitus     Hyperlipidemia     Hypertension      OB History    Para Term  AB Living   1 1           SAB IAB Ectopic Multiple Live Births                  # Outcome Date GA Lbr Kevin/2nd Weight Sex Type Anes PTL Lv   1 Para                 Review of Systems  Review of Systems    Negative except for pertinent findings for positives per HPI     Objective:    Physical Exam    BP (!) 148/64 (BP Location: Left arm, Patient Position: Sitting)   Pulse 60   Temp 98.4 °F (36.9 °C) (Oral)   Resp 20   Ht 5' 3" (1.6 m)   Wt 64.1 kg (141 lb 6.4 oz)   SpO2 100%   BMI 25.05 kg/m²   GENERAL: Well-developed female. No acute distress.    SKIN: Normal to inspection, " warm and intact.  BREASTS: No rashes or erythema. No masses, lumps, discharge, tenderness.  VULVA: General appearance normal; external genitalia with no lesions or erythema.  VAGINA: Mucosa/vaginal vault pale, no abnormal discharge or lesions.  CERVIX: pale, stenotic appearing os, no erythema or abnormal discharge.  BIMANUAL EXAM: reveals a 10 week-sized uterus. The uterus is non tender. Brice adnexa reveal no tenderness.  PSYCHIATRIC: Patient is oriented to person, place, and time. Mood and affect are normal.    Assessment:         ICD-10-CM ICD-9-CM   1. Pap smear for cervical cancer screening  Z12.4 V76.2   2. Visit for screening mammogram  Z12.31 V76.12     Plan:   Robyn was seen today for gynecologic exam.    Diagnoses and all orders for this visit:    Pap smear for cervical cancer screening  -     Liquid-Based Pap Smear, Screening    Visit for screening mammogram  -     Mammo Digital Screening Bilat w/ [facility]; Future    Pap today  MG ordered  Follow up in about 1 year (around 10/17/2025) for annual exam.

## 2024-10-25 ENCOUNTER — HOSPITAL ENCOUNTER (OUTPATIENT)
Dept: RADIOLOGY | Facility: HOSPITAL | Age: 64
Discharge: HOME | End: 2024-10-25
Attending: NURSE PRACTITIONER
Payer: MEDICARE

## 2024-10-25 DIAGNOSIS — Z12.31 VISIT FOR SCREENING MAMMOGRAM: ICD-10-CM

## 2024-10-25 PROCEDURE — 77067 SCR MAMMO BI INCL CAD: CPT | Mod: 26,,, | Performed by: STUDENT IN AN ORGANIZED HEALTH CARE EDUCATION/TRAINING PROGRAM

## 2024-10-25 PROCEDURE — 77067 SCR MAMMO BI INCL CAD: CPT | Mod: TC

## 2024-10-25 PROCEDURE — 77063 BREAST TOMOSYNTHESIS BI: CPT | Mod: 26,,, | Performed by: STUDENT IN AN ORGANIZED HEALTH CARE EDUCATION/TRAINING PROGRAM

## 2024-10-25 PROCEDURE — 77063 BREAST TOMOSYNTHESIS BI: CPT | Mod: TC
